# Patient Record
Sex: MALE | Race: WHITE | HISPANIC OR LATINO | Employment: STUDENT | URBAN - METROPOLITAN AREA
[De-identification: names, ages, dates, MRNs, and addresses within clinical notes are randomized per-mention and may not be internally consistent; named-entity substitution may affect disease eponyms.]

---
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
## 2024-01-22 ENCOUNTER — OFFICE VISIT (OUTPATIENT)
Dept: PSYCHIATRY | Facility: CLINIC | Age: 19
End: 2024-01-22
Payer: COMMERCIAL

## 2024-01-22 VITALS
SYSTOLIC BLOOD PRESSURE: 108 MMHG | HEIGHT: 65 IN | WEIGHT: 172.94 LBS | HEART RATE: 77 BPM | DIASTOLIC BLOOD PRESSURE: 58 MMHG | BODY MASS INDEX: 28.81 KG/M2

## 2024-01-22 DIAGNOSIS — F90.0 ADHD (ATTENTION DEFICIT HYPERACTIVITY DISORDER), INATTENTIVE TYPE: ICD-10-CM

## 2024-01-22 DIAGNOSIS — F33.0 MILD EPISODE OF RECURRENT MAJOR DEPRESSIVE DISORDER: Primary | ICD-10-CM

## 2024-01-22 PROCEDURE — 3008F BODY MASS INDEX DOCD: CPT | Mod: CPTII,S$GLB,, | Performed by: PSYCHIATRY & NEUROLOGY

## 2024-01-22 PROCEDURE — 3078F DIAST BP <80 MM HG: CPT | Mod: CPTII,S$GLB,, | Performed by: PSYCHIATRY & NEUROLOGY

## 2024-01-22 PROCEDURE — 99215 OFFICE O/P EST HI 40 MIN: CPT | Mod: S$GLB,,, | Performed by: PSYCHIATRY & NEUROLOGY

## 2024-01-22 PROCEDURE — 3074F SYST BP LT 130 MM HG: CPT | Mod: CPTII,S$GLB,, | Performed by: PSYCHIATRY & NEUROLOGY

## 2024-01-22 PROCEDURE — 99999 PR PBB SHADOW E&M-EST. PATIENT-LVL II: CPT | Mod: PBBFAC,,, | Performed by: PSYCHIATRY & NEUROLOGY

## 2024-01-22 RX ORDER — SERTRALINE HYDROCHLORIDE 100 MG/1
100 TABLET, FILM COATED ORAL DAILY
Qty: 90 TABLET | Refills: 1 | Status: SHIPPED | OUTPATIENT
Start: 2024-01-22 | End: 2024-07-20

## 2024-01-22 NOTE — PROGRESS NOTES
"  Outpatient Psychiatry Follow-Up Visit with MD     1/22/2024-in person      Last appointment:3/07/2023     Missed appointment:12/14/20201     Adolescent's  Name: Alpesh Rae  Grade: graduated 12 th grade 2022  School: graduated [employer] and now attending [employer]  Mother:Donna Posey       Clinical Status of Patient: Outpatient (Ambulatory)     The patient location is: [facility]   The chief complaint leading to consultation is: ongoing medication management of depression  Visit type: in person   Total time spent with patient: 40 minutes     50 minutes spend on the encounter     Each patient to whom he or she provides medical services by telemedicine is:  (1) informed of the relationship between the physician and patient and the respective role of any other health care provider with respect to management of the patient; and (2) notified that he or she may decline to receive medical services by telemedicine and may withdraw from such care at any time.     Notes:     Chief Complaint:  Alpesh Rae is a 18 y.o. male who presents today for follow-up of depression. Spoke with Alpesh today.          "I love the job. The managers can be rough."    Interval History and Content of Current Session:  Interim Events/Subjective Report/Content of Current Session:     "I stopped taking the Zoloft over summer and so I had it left over and I was only taking the 100 mg pills."     Lost to follow up.      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     "I love the job and I love the bugs and I love it all."    "I am mostly in the [employer]. The new one is not as pretty. They are all over the place."    "I am still on my Mom's health insurance."    "I started the job early March so it has been 10 months."    "I make less than other people. I don't have a degree. 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 I am settled in and other people are in flux."    "I have dated."    "I am living at home. I am saving up money right now. I am good a budgeting my money."    "I was still taking my medication. I feel like I have more season depression."    "I actually know bugs."    "I am working full time. I am just 40 hours a week."    "There are a bunch of bug museums."    "I have a fiat and I love my little car. I love it and it is fun to drive."    "I love my parking too and my little car is so easy to park."    "I am all good. I get along with my Mom. I am not a party person. I don't like parades."      No agitation  No appetite changes   No sleep problems  No SI  No HI        Psychotherapy:  Target symptoms: recurrent depression, adjustment to returning to school, new job  Why chosen therapy is appropriate versus another modality: relevant to diagnosis, patient responds to this modality, evidence based practice  Outcome monitoring methods: self-report, observation  Therapeutic intervention type: insight oriented psychotherapy, behavior modifying psychotherapy, supportive psychotherapy  Topics discussed/themes: difficulty managing affect in interpersonal relationships, building skills sets for symptom management, symptom recognition  The patient's response to the intervention is accepting. The patient's progress toward treatment goals is fair.   Duration of intervention: 5 minutes.           Review of Systems   Review of Systems      No tic  No tremor  No HA  No insomnia     Past Medical, Family and Social History: The patient's past medical, family and social history have been reviewed and updated as appropriate within the electronic medical record - see encounter notes. Grades are "good" but he continues to keep "a distance from my friends."  He prefers online friends.     Graduated " 12 th grade 2021-22     Broke up with his GF around 4/25/2022 which prompted a return to the office.     Compliance: yes     Side effects: none     Risk Parameters:  Patient reports no suicidal ideation  Patient reports no homicidal ideation  Patient reports no self-injurious behavior  Patient reports no violent behavior         Wt Readings from Last 3 Encounters:   01/22/24 78.4 kg (172 lb 15.2 oz) (74 %, Z= 0.64)*   04/11/22 69.7 kg (153 lb 10.6 oz) (58 %, Z= 0.21)*   03/02/22 68.8 kg (151 lb 10.8 oz) (56 %, Z= 0.16)*     * Growth percentiles are based on [facility] (Boys, 2-20 Years) data.     Temp Readings from Last 3 Encounters:   04/11/22 98.2 °F (36.8 °C) (Oral)   05/02/19 97.2 °F (36.2 °C) (Oral)   03/19/19 97.3 °F (36.3 °C) (Temporal)     BP Readings from Last 3 Encounters:   01/22/24 (!) 108/58   03/02/22 125/75 (81 %, Z = 0.88 /  81 %, Z = 0.88)*   01/03/20 114/60     *BP percentiles are based on the 2017 AAP Clinical Practice Guideline for boys     Pulse Readings from Last 3 Encounters:   01/22/24 77   04/11/22 70   01/03/20 79       Exam (detailed: at least 9 elements; comprehensive: all 15 elements)   Constitutional  Vitals:  Most recent vital signs, dated 1/22/2024, were reviewed.   There were no vitals filed for this visit.      General:  unremarkable, age appropriate, casually dressed, neatly groomed      Musculoskeletal  Muscle Strength/Tone:  no tremor, no tic   Gait & Station:  non-ataxic      Psychiatric  Speech:  no latency; no press, spontaneous   Mood & Affect:  Euthymic   congruent and appropriate, mood-congruent   Thought Process:  normal and logical, goal-directed   Associations:  intact   Thought Content:  normal, no suicidality, no homicidality, delusions, or paranoia   Insight:  intact   Judgement: behavior is adequate to circumstances   Orientation:  person, place, situation, time/date, day of week, month of year, year   Memory: intact for content of interview, able to remember recent events-  "yes, able to remember remote events- yes   Language: grossly intact, able to name, able to repeat   Attention Span & Concentration:  able to focus   Fund of Knowledge:  intact and appropriate to age and level of education, familiar with aspects of current personal life            Assessment and Diagnosis      General Impression: Alpesh appears to have had improvement in his symptoms of depression since going back to Zoloft 150 mg but continues to avoid close relationships with same age peers in person preferring online friendships.  He was doing well and had a GF and he stopped taking his medication. Surrounding that relationship ending he "got depressed again." He was asking to get back on the Zoloft prior to the break up with his GF.          ICD-10-CM ICD-9-CM   1. Mild episode of recurrent major depressive disorder  F33.0 296.31   2. ADHD (attention deficit hyperactivity disorder), inattentive type  F90.0 314.00               Intervention/Counseling/Treatment Plan    Zoloft to 100 mg daily   [facility]            Return to Clinic: 3 months           "

## 2024-04-15 ENCOUNTER — PATIENT MESSAGE (OUTPATIENT)
Dept: PEDIATRICS | Facility: CLINIC | Age: 19
End: 2024-04-15
Payer: COMMERCIAL